# Patient Record
Sex: FEMALE | Race: WHITE | NOT HISPANIC OR LATINO | Employment: PART TIME | URBAN - METROPOLITAN AREA
[De-identification: names, ages, dates, MRNs, and addresses within clinical notes are randomized per-mention and may not be internally consistent; named-entity substitution may affect disease eponyms.]

---

## 2021-04-30 LAB — SARS-COV-2 RNA RESP QL NAA+PROBE: POSITIVE

## 2022-01-27 LAB — BCS RECOMMENDATION EXT: NORMAL

## 2022-02-15 LAB
PAP RECOMMENDATION EXT: NORMAL
PAP SMEAR: NORMAL

## 2022-04-11 ENCOUNTER — HISTORICAL (OUTPATIENT)
Dept: ADMINISTRATIVE | Facility: HOSPITAL | Age: 52
End: 2022-04-11

## 2022-04-26 VITALS
DIASTOLIC BLOOD PRESSURE: 79 MMHG | BODY MASS INDEX: 35.59 KG/M2 | OXYGEN SATURATION: 98 % | SYSTOLIC BLOOD PRESSURE: 122 MMHG | HEIGHT: 61 IN | WEIGHT: 188.5 LBS

## 2022-05-24 ENCOUNTER — APPOINTMENT (OUTPATIENT)
Dept: LAB | Facility: HOSPITAL | Age: 52
End: 2022-05-24
Attending: INTERNAL MEDICINE
Payer: COMMERCIAL

## 2022-05-24 DIAGNOSIS — Z80.0 FAMILY HISTORY OF MALIGNANT NEOPLASM OF GASTROINTESTINAL TRACT: Primary | ICD-10-CM

## 2022-05-24 LAB
ALBUMIN SERPL-MCNC: 4.2 GM/DL (ref 3.5–5)
ALBUMIN/GLOB SERPL: 1.6 RATIO (ref 1.1–2)
ALP SERPL-CCNC: 101 UNIT/L (ref 40–150)
ALT SERPL-CCNC: 30 UNIT/L (ref 0–55)
AST SERPL-CCNC: 27 UNIT/L (ref 5–34)
BASOPHILS # BLD AUTO: 0.05 X10(3)/MCL (ref 0–0.2)
BASOPHILS NFR BLD AUTO: 0.7 %
BILIRUBIN DIRECT+TOT PNL SERPL-MCNC: 0.7 MG/DL
BUN SERPL-MCNC: 12.9 MG/DL (ref 9.8–20.1)
CALCIUM SERPL-MCNC: 9.8 MG/DL (ref 8.4–10.2)
CHLORIDE SERPL-SCNC: 103 MMOL/L (ref 98–107)
CO2 SERPL-SCNC: 28 MMOL/L (ref 22–29)
CREAT SERPL-MCNC: 0.75 MG/DL (ref 0.55–1.02)
EOSINOPHIL # BLD AUTO: 0.12 X10(3)/MCL (ref 0–0.9)
EOSINOPHIL NFR BLD AUTO: 1.6 %
ERYTHROCYTE [DISTWIDTH] IN BLOOD BY AUTOMATED COUNT: 12.3 % (ref 11.5–17)
GLOBULIN SER-MCNC: 2.6 GM/DL (ref 2.4–3.5)
GLUCOSE SERPL-MCNC: 139 MG/DL (ref 74–100)
HCT VFR BLD AUTO: 42.8 % (ref 37–47)
HGB BLD-MCNC: 14.5 GM/DL (ref 12–16)
IMM GRANULOCYTES # BLD AUTO: 0.02 X10(3)/MCL (ref 0–0.02)
IMM GRANULOCYTES NFR BLD AUTO: 0.3 % (ref 0–0.43)
LYMPHOCYTES # BLD AUTO: 1.7 X10(3)/MCL (ref 0.6–4.6)
LYMPHOCYTES NFR BLD AUTO: 22.9 %
MCH RBC QN AUTO: 30.6 PG (ref 27–31)
MCHC RBC AUTO-ENTMCNC: 33.9 MG/DL (ref 33–36)
MCV RBC AUTO: 90.3 FL (ref 80–94)
MONOCYTES # BLD AUTO: 0.37 X10(3)/MCL (ref 0.1–1.3)
MONOCYTES NFR BLD AUTO: 5 %
NEUTROPHILS # BLD AUTO: 5.2 X10(3)/MCL (ref 2.1–9.2)
NEUTROPHILS NFR BLD AUTO: 69.5 %
NRBC BLD AUTO-RTO: 0 %
PLATELET # BLD AUTO: 300 X10(3)/MCL (ref 130–400)
PMV BLD AUTO: 10.3 FL (ref 9.4–12.4)
POTASSIUM SERPL-SCNC: 4.1 MMOL/L (ref 3.5–5.1)
PROT SERPL-MCNC: 6.8 GM/DL (ref 6.4–8.3)
RBC # BLD AUTO: 4.74 X10(6)/MCL (ref 4.2–5.4)
SODIUM SERPL-SCNC: 140 MMOL/L (ref 136–145)
WBC # SPEC AUTO: 7.4 X10(3)/MCL (ref 4.5–11.5)

## 2022-05-24 PROCEDURE — 85025 COMPLETE CBC W/AUTO DIFF WBC: CPT

## 2022-05-24 PROCEDURE — 36415 COLL VENOUS BLD VENIPUNCTURE: CPT

## 2022-05-24 PROCEDURE — 80053 COMPREHEN METABOLIC PANEL: CPT

## 2022-06-06 LAB — CRC RECOMMENDATION EXT: NORMAL

## 2022-06-13 ENCOUNTER — OFFICE VISIT (OUTPATIENT)
Dept: URGENT CARE | Facility: CLINIC | Age: 52
End: 2022-06-13
Payer: COMMERCIAL

## 2022-06-13 VITALS
WEIGHT: 175 LBS | DIASTOLIC BLOOD PRESSURE: 82 MMHG | OXYGEN SATURATION: 99 % | SYSTOLIC BLOOD PRESSURE: 132 MMHG | BODY MASS INDEX: 33.04 KG/M2 | HEIGHT: 61 IN | TEMPERATURE: 99 F | RESPIRATION RATE: 18 BRPM | HEART RATE: 86 BPM

## 2022-06-13 DIAGNOSIS — M25.571 ACUTE RIGHT ANKLE PAIN: ICD-10-CM

## 2022-06-13 DIAGNOSIS — S93.491A SPRAIN OF ANTERIOR TALOFIBULAR LIGAMENT OF RIGHT ANKLE, INITIAL ENCOUNTER: Primary | ICD-10-CM

## 2022-06-13 PROCEDURE — 3075F PR MOST RECENT SYSTOLIC BLOOD PRESS GE 130-139MM HG: ICD-10-PCS | Mod: CPTII,,, | Performed by: FAMILY MEDICINE

## 2022-06-13 PROCEDURE — 3008F BODY MASS INDEX DOCD: CPT | Mod: CPTII,,, | Performed by: FAMILY MEDICINE

## 2022-06-13 PROCEDURE — 3079F PR MOST RECENT DIASTOLIC BLOOD PRESSURE 80-89 MM HG: ICD-10-PCS | Mod: CPTII,,, | Performed by: FAMILY MEDICINE

## 2022-06-13 PROCEDURE — 3075F SYST BP GE 130 - 139MM HG: CPT | Mod: CPTII,,, | Performed by: FAMILY MEDICINE

## 2022-06-13 PROCEDURE — 1160F PR REVIEW ALL MEDS BY PRESCRIBER/CLIN PHARMACIST DOCUMENTED: ICD-10-PCS | Mod: CPTII,,, | Performed by: FAMILY MEDICINE

## 2022-06-13 PROCEDURE — 1160F RVW MEDS BY RX/DR IN RCRD: CPT | Mod: CPTII,,, | Performed by: FAMILY MEDICINE

## 2022-06-13 PROCEDURE — 99214 OFFICE O/P EST MOD 30 MIN: CPT | Mod: ,,, | Performed by: FAMILY MEDICINE

## 2022-06-13 PROCEDURE — 3008F PR BODY MASS INDEX (BMI) DOCUMENTED: ICD-10-PCS | Mod: CPTII,,, | Performed by: FAMILY MEDICINE

## 2022-06-13 PROCEDURE — 99214 PR OFFICE/OUTPT VISIT, EST, LEVL IV, 30-39 MIN: ICD-10-PCS | Mod: ,,, | Performed by: FAMILY MEDICINE

## 2022-06-13 PROCEDURE — 1159F PR MEDICATION LIST DOCUMENTED IN MEDICAL RECORD: ICD-10-PCS | Mod: CPTII,,, | Performed by: FAMILY MEDICINE

## 2022-06-13 PROCEDURE — 3079F DIAST BP 80-89 MM HG: CPT | Mod: CPTII,,, | Performed by: FAMILY MEDICINE

## 2022-06-13 PROCEDURE — 1159F MED LIST DOCD IN RCRD: CPT | Mod: CPTII,,, | Performed by: FAMILY MEDICINE

## 2022-06-13 RX ORDER — MELOXICAM 15 MG/1
15 TABLET ORAL DAILY
Qty: 14 TABLET | Refills: 0 | Status: SHIPPED | OUTPATIENT
Start: 2022-06-13 | End: 2022-06-27

## 2022-06-13 NOTE — PROGRESS NOTES
"Subjective:       Patient ID: Cecelia Braun is a 51 y.o. female.    Vitals:  height is 5' 1" (1.549 m) and weight is 79.4 kg (175 lb). Her temperature is 98.7 °F (37.1 °C). Her blood pressure is 132/82 and her pulse is 86. Her respiration is 18 and oxygen saturation is 99%.     Chief Complaint: Ankle Injury (Right ankle )    Right ankle injury, pt [place].     Ankle Injury         Constitution: Negative for fatigue and fever.   Cardiovascular: Negative for chest pain.   Musculoskeletal: Positive for pain and trauma.   Neurological: Negative for dizziness.       Objective:      Physical Exam   HENT:   Head: Normocephalic.   Cardiovascular: Normal rate.   Pulmonary/Chest: Effort normal.   Abdominal: Normal appearance.   Musculoskeletal:      Comments: Pos TTP of lateral malleolus and anterior R ankle, no ankle mortise laxity, no TTP of RLE otherwise   Neurological: She is alert.   Psychiatric: Her behavior is normal. Mood normal.   Nursing note and vitals reviewed.        Assessment:       1. Sprain of anterior talofibular ligament of right ankle, initial encounter    2. Acute right ankle pain          Plan:         Sprain of anterior talofibular ligament of right ankle, initial encounter    Acute right ankle pain  -     XR ANKLE COMPLETE 3 VIEW RIGHT; Future; Expected date: 06/13/2022            Xray of the right ankle done today, per my review no fracture.   Final also negative for fracture.        "

## 2022-06-13 NOTE — PATIENT INSTRUCTIONS
Wrap, ice, elevate. Mobic with food.  Reduce activity for at least 2 weeks.  Use as tolerated otherwise.   Final x ray returned negative for fracture.

## 2022-09-19 ENCOUNTER — OFFICE VISIT (OUTPATIENT)
Dept: URGENT CARE | Facility: CLINIC | Age: 52
End: 2022-09-19
Payer: COMMERCIAL

## 2022-09-19 ENCOUNTER — TELEPHONE (OUTPATIENT)
Dept: URGENT CARE | Facility: CLINIC | Age: 52
End: 2022-09-19

## 2022-09-19 VITALS
HEIGHT: 61 IN | RESPIRATION RATE: 20 BRPM | BODY MASS INDEX: 33.04 KG/M2 | TEMPERATURE: 99 F | HEART RATE: 74 BPM | WEIGHT: 175 LBS | OXYGEN SATURATION: 98 % | DIASTOLIC BLOOD PRESSURE: 85 MMHG | SYSTOLIC BLOOD PRESSURE: 127 MMHG

## 2022-09-19 DIAGNOSIS — R10.9 FLANK PAIN: ICD-10-CM

## 2022-09-19 DIAGNOSIS — S39.012A BACK STRAIN, INITIAL ENCOUNTER: ICD-10-CM

## 2022-09-19 DIAGNOSIS — R10.9 RIGHT SIDED ABDOMINAL PAIN: Primary | ICD-10-CM

## 2022-09-19 LAB
BILIRUB UR QL STRIP: NEGATIVE
GLUCOSE UR QL STRIP: NEGATIVE
KETONES UR QL STRIP: NEGATIVE
LEUKOCYTE ESTERASE UR QL STRIP: POSITIVE
PH, POC UA: 5
POC BLOOD, URINE: NEGATIVE
POC NITRATES, URINE: NEGATIVE
PROT UR QL STRIP: NEGATIVE
SP GR UR STRIP: 1.01 (ref 1–1.03)
UROBILINOGEN UR STRIP-ACNC: ABNORMAL (ref 0.3–2.2)

## 2022-09-19 PROCEDURE — 3008F PR BODY MASS INDEX (BMI) DOCUMENTED: ICD-10-PCS | Mod: CPTII,,, | Performed by: FAMILY MEDICINE

## 2022-09-19 PROCEDURE — 1159F PR MEDICATION LIST DOCUMENTED IN MEDICAL RECORD: ICD-10-PCS | Mod: CPTII,,, | Performed by: FAMILY MEDICINE

## 2022-09-19 PROCEDURE — 3079F PR MOST RECENT DIASTOLIC BLOOD PRESSURE 80-89 MM HG: ICD-10-PCS | Mod: CPTII,,, | Performed by: FAMILY MEDICINE

## 2022-09-19 PROCEDURE — 1160F PR REVIEW ALL MEDS BY PRESCRIBER/CLIN PHARMACIST DOCUMENTED: ICD-10-PCS | Mod: CPTII,,, | Performed by: FAMILY MEDICINE

## 2022-09-19 PROCEDURE — 81003 URINALYSIS AUTO W/O SCOPE: CPT | Mod: QW,,, | Performed by: FAMILY MEDICINE

## 2022-09-19 PROCEDURE — 3079F DIAST BP 80-89 MM HG: CPT | Mod: CPTII,,, | Performed by: FAMILY MEDICINE

## 2022-09-19 PROCEDURE — 81003 POCT URINALYSIS, DIPSTICK, AUTOMATED, W/O SCOPE: ICD-10-PCS | Mod: QW,,, | Performed by: FAMILY MEDICINE

## 2022-09-19 PROCEDURE — 1160F RVW MEDS BY RX/DR IN RCRD: CPT | Mod: CPTII,,, | Performed by: FAMILY MEDICINE

## 2022-09-19 PROCEDURE — 99214 OFFICE O/P EST MOD 30 MIN: CPT | Mod: ,,, | Performed by: FAMILY MEDICINE

## 2022-09-19 PROCEDURE — 3008F BODY MASS INDEX DOCD: CPT | Mod: CPTII,,, | Performed by: FAMILY MEDICINE

## 2022-09-19 PROCEDURE — 3074F SYST BP LT 130 MM HG: CPT | Mod: CPTII,,, | Performed by: FAMILY MEDICINE

## 2022-09-19 PROCEDURE — 3074F PR MOST RECENT SYSTOLIC BLOOD PRESSURE < 130 MM HG: ICD-10-PCS | Mod: CPTII,,, | Performed by: FAMILY MEDICINE

## 2022-09-19 PROCEDURE — 1159F MED LIST DOCD IN RCRD: CPT | Mod: CPTII,,, | Performed by: FAMILY MEDICINE

## 2022-09-19 PROCEDURE — 99214 PR OFFICE/OUTPT VISIT, EST, LEVL IV, 30-39 MIN: ICD-10-PCS | Mod: ,,, | Performed by: FAMILY MEDICINE

## 2022-09-19 NOTE — PROGRESS NOTES
"Subjective:       Patient ID: Cecelia Braun is a 52 y.o. female.    Vitals:  height is 5' 1" (1.549 m) and weight is 79.4 kg (175 lb). Her oral temperature is 99.3 °F (37.4 °C). Her blood pressure is 127/85 and her pulse is 74. Her respiration is 20 and oxygen saturation is 98%.     Chief Complaint: Back Pain (Back pain for 10 days. Pain is now radiating to right pelvic area. Tried Ibuprofen with no relief. )    10 days of back pain started R lower back and now radiation into the RLQ.  Worse with bending and standing straight.  No dysuria, no NVD, no constipation.  No fever.  No trauma.  Better with NSAID.       Constitution: Negative for chills, sweating and fever.   HENT:  Negative for sinus pressure.    Respiratory:  Negative for cough.    Genitourinary:  Negative for frequency, urgency, flank pain, vaginal discharge and pelvic pain.   Skin:  Negative for rash.   Neurological:  Negative for loss of consciousness.     Objective:      Physical Exam   Constitutional: She is oriented to person, place, and time. She appears well-developed.   HENT:   Head: Normocephalic and atraumatic.   Mouth/Throat: Mucous membranes are normal.   Eyes: Lids are normal.   Neck: Trachea normal. Neck supple.   Cardiovascular: Normal rate, regular rhythm and normal heart sounds.   Pulmonary/Chest: Effort normal and breath sounds normal. No respiratory distress.   Abdominal: Normal appearance. She exhibits no abdominal bruit, no pulsatile midline mass and no mass. Soft. There is abdominal tenderness (R flank). There is no rebound. No hernia.   Musculoskeletal: Normal range of motion.         General: Normal range of motion.   Neurological: no focal deficit. She is alert and oriented to person, place, and time. She has normal strength.   Skin: Skin is warm, dry, intact, not diaphoretic and not pale.   Psychiatric: Her speech is normal and behavior is normal. Judgment and thought content normal.   Nursing note and vitals reviewed.    "   Assessment:       1. Back strain, initial encounter    2. Flank pain    3. Right sided abdominal pain            Plan:         Back strain, initial encounter    Flank pain  -     POCT Urinalysis, Dipstick, Automated, W/O Scope    Right sided abdominal pain  -     XR KUB; Future; Expected date: 09/19/2022          X ray per my review mild constipation. No visualized stones.   Will also call with final report.        Pt deferred Rx treatment at this time for back strain.

## 2022-09-19 NOTE — TELEPHONE ENCOUNTER
----- Message from Juanita Olsen MD sent at 9/19/2022 12:27 PM CDT -----  Please notify pt that final x ray as questionable calcified areas around the right kidney.  Can continue treatment plan, force fluids, but we can also get an ultrasound to further evaluate if desires.   ----- Message -----  From: RT Teressa  Sent: 9/19/2022  12:18 PM CDT  To: [facility] Results      ----- Message -----  From: RT Teressa  Sent: 9/19/2022  11:51 AM CDT  To: [facility] Clinical Support Staff

## 2022-09-20 ENCOUNTER — TELEPHONE (OUTPATIENT)
Dept: URGENT CARE | Facility: CLINIC | Age: 52
End: 2022-09-20
Payer: COMMERCIAL

## 2022-09-22 ENCOUNTER — HOSPITAL ENCOUNTER (OUTPATIENT)
Dept: RADIOLOGY | Facility: HOSPITAL | Age: 52
Discharge: HOME | End: 2022-09-22
Attending: FAMILY MEDICINE
Payer: COMMERCIAL

## 2022-09-22 ENCOUNTER — HISTORICAL (OUTPATIENT)
Dept: ADMINISTRATIVE | Facility: HOSPITAL | Age: 52
End: 2022-09-22
Payer: COMMERCIAL

## 2022-09-22 DIAGNOSIS — R10.9 RIGHT SIDED ABDOMINAL PAIN: ICD-10-CM

## 2022-09-22 PROCEDURE — 76775 US EXAM ABDO BACK WALL LIM: CPT | Mod: TC

## 2022-09-23 ENCOUNTER — TELEPHONE (OUTPATIENT)
Dept: URGENT CARE | Facility: CLINIC | Age: 52
End: 2022-09-23
Payer: COMMERCIAL

## 2022-09-23 NOTE — TELEPHONE ENCOUNTER
Spoke with pt, informed of results. She does not have a PCP, gave her NP Mimi Allison's number, she said she will call and schedule on Monday. She states she is feeling much better and her pain has mostly subsided.

## 2022-09-23 NOTE — TELEPHONE ENCOUNTER
----- Message from Juanita Olsen MD sent at 9/23/2022  8:04 AM CDT -----  Please notify pt that ultrasound showed kidney stones in each kidney, none causing issues, none in the ureter causing blockage.  There is a questionable small mass of the right kidney that should be re-ultrasound in 6 months.  Please follow up with primary MD in regards to this.  How is the patient feeling? Any improvement in R sided pain?   ----- Message -----  From: Interface, Rad Results In  Sent: 9/22/2022   3:39 PM CDT  To: [facility] Results

## 2022-10-04 ENCOUNTER — OFFICE VISIT (OUTPATIENT)
Dept: FAMILY MEDICINE | Facility: CLINIC | Age: 52
End: 2022-10-04
Payer: COMMERCIAL

## 2022-10-04 VITALS
TEMPERATURE: 98 F | BODY MASS INDEX: 35.46 KG/M2 | HEIGHT: 61 IN | DIASTOLIC BLOOD PRESSURE: 86 MMHG | RESPIRATION RATE: 18 BRPM | WEIGHT: 187.81 LBS | SYSTOLIC BLOOD PRESSURE: 136 MMHG | OXYGEN SATURATION: 97 % | HEART RATE: 87 BPM

## 2022-10-04 DIAGNOSIS — Z11.59 ENCOUNTER FOR HEPATITIS C SCREENING TEST FOR LOW RISK PATIENT: ICD-10-CM

## 2022-10-04 DIAGNOSIS — Z11.4 ENCOUNTER FOR SCREENING FOR HIV: ICD-10-CM

## 2022-10-04 DIAGNOSIS — Z00.00 ANNUAL PHYSICAL EXAM: Primary | ICD-10-CM

## 2022-10-04 DIAGNOSIS — R73.9 HYPERGLYCEMIA: ICD-10-CM

## 2022-10-04 DIAGNOSIS — Z87.442 HISTORY OF NEPHROLITHIASIS: ICD-10-CM

## 2022-10-04 DIAGNOSIS — D17.71 ANGIOMYOLIPOMA OF LEFT KIDNEY: ICD-10-CM

## 2022-10-04 PROCEDURE — 1159F MED LIST DOCD IN RCRD: CPT | Mod: CPTII,,, | Performed by: STUDENT IN AN ORGANIZED HEALTH CARE EDUCATION/TRAINING PROGRAM

## 2022-10-04 PROCEDURE — 3079F PR MOST RECENT DIASTOLIC BLOOD PRESSURE 80-89 MM HG: ICD-10-PCS | Mod: CPTII,,, | Performed by: STUDENT IN AN ORGANIZED HEALTH CARE EDUCATION/TRAINING PROGRAM

## 2022-10-04 PROCEDURE — 3008F PR BODY MASS INDEX (BMI) DOCUMENTED: ICD-10-PCS | Mod: CPTII,,, | Performed by: STUDENT IN AN ORGANIZED HEALTH CARE EDUCATION/TRAINING PROGRAM

## 2022-10-04 PROCEDURE — 1159F PR MEDICATION LIST DOCUMENTED IN MEDICAL RECORD: ICD-10-PCS | Mod: CPTII,,, | Performed by: STUDENT IN AN ORGANIZED HEALTH CARE EDUCATION/TRAINING PROGRAM

## 2022-10-04 PROCEDURE — 3075F SYST BP GE 130 - 139MM HG: CPT | Mod: CPTII,,, | Performed by: STUDENT IN AN ORGANIZED HEALTH CARE EDUCATION/TRAINING PROGRAM

## 2022-10-04 PROCEDURE — 99386 PR PREVENTIVE VISIT,NEW,40-64: ICD-10-PCS | Mod: ,,, | Performed by: STUDENT IN AN ORGANIZED HEALTH CARE EDUCATION/TRAINING PROGRAM

## 2022-10-04 PROCEDURE — 3008F BODY MASS INDEX DOCD: CPT | Mod: CPTII,,, | Performed by: STUDENT IN AN ORGANIZED HEALTH CARE EDUCATION/TRAINING PROGRAM

## 2022-10-04 PROCEDURE — 99386 PREV VISIT NEW AGE 40-64: CPT | Mod: ,,, | Performed by: STUDENT IN AN ORGANIZED HEALTH CARE EDUCATION/TRAINING PROGRAM

## 2022-10-04 PROCEDURE — 3075F PR MOST RECENT SYSTOLIC BLOOD PRESS GE 130-139MM HG: ICD-10-PCS | Mod: CPTII,,, | Performed by: STUDENT IN AN ORGANIZED HEALTH CARE EDUCATION/TRAINING PROGRAM

## 2022-10-04 PROCEDURE — 3079F DIAST BP 80-89 MM HG: CPT | Mod: CPTII,,, | Performed by: STUDENT IN AN ORGANIZED HEALTH CARE EDUCATION/TRAINING PROGRAM

## 2022-10-04 NOTE — ASSESSMENT & PLAN NOTE
- Patient had her first episode on 09/19/22. Patient had U/A and KUB performed. She had a kidney U/S on 09/22/22. She denies any fevers, chills, abdominal pain, N/V, flank pain, dysuria, or hematuria. Kidney U/S positive for bilateral nephrolithiasis and a hyperechoic 12mm lesion of the left kidney possibly angiomyolipoma. Patient amenable to getting a follow-up U/S in 6 months.

## 2022-10-04 NOTE — PROGRESS NOTES
"Subjective:      Patient ID: Cecelia Braun is a 52 y.o.  female.    Chief Complaint: Establish Care/Wellness    Preventative Health: Patient following with Dr. Milo Kidd with OB-GYN for her well-woman exam. Patient gets her mammogram performed at the [facility] in [place], [place]. Colon CA Surveillance - Patient following with Dr. Rekha Valdes with GI and Dr. Rick Braun.    FH: Mother had colon CA at 45 years old.    Hx of Kidney Stones: Patient had her first episode on 09/19/22. Patient had a U/A and KUB performed. She had a kidney U/S on 09/22/22. Kidney U/S positive for bilateral nephrolithiasis and a hyperechoic 12mm lesion of the left kidney possibly angiomyolipoma. Patient amenable to getting a follow-up U/S in 6 months. She denies any fevers, chills, abdominal pain, N/V, flank pain, dysuria, or hematuria.    Review of Systems   Constitutional:  Negative for activity change, fatigue and fever.   Eyes:  Negative for visual disturbance.   Respiratory:  Negative for apnea, cough and shortness of breath.    Cardiovascular:  Negative for chest pain and leg swelling.   Gastrointestinal:  Negative for abdominal pain, diarrhea and nausea.   Genitourinary:  Negative for dysuria, flank pain, frequency and hematuria.   Musculoskeletal:  Negative for arthralgias, back pain and myalgias.   Skin:  Negative for rash and wound.   Neurological:  Negative for dizziness, weakness, numbness and headaches.   Psychiatric/Behavioral:  Negative for behavioral problems and dysphoric mood.      Objective:   /86 (BP Location: Right arm, Patient Position: Sitting, BP Method: Large (Automatic))   Pulse 87   Temp 98.2 °F (36.8 °C) (Temporal)   Resp 18   Ht 5' 1" (1.549 m)   Wt 85.2 kg (187 lb 12.8 oz)   SpO2 97%   BMI 35.48 kg/m²     Physical Exam  Vitals and nursing note reviewed.   Constitutional:       General: She is not in acute distress.     Appearance: Normal appearance. She is not " ill-appearing or toxic-appearing.   HENT:      Head: Normocephalic and atraumatic.      Mouth/Throat:      Mouth: Mucous membranes are moist.      Pharynx: Oropharynx is clear.   Cardiovascular:      Rate and Rhythm: Normal rate and regular rhythm.      Heart sounds: Normal heart sounds. No murmur heard.  Pulmonary:      Effort: Pulmonary effort is normal. No respiratory distress.      Breath sounds: Normal breath sounds.   Abdominal:      General: There is no distension.      Palpations: Abdomen is soft.      Tenderness: There is no abdominal tenderness.   Musculoskeletal:         General: No tenderness or deformity.      Cervical back: Neck supple. No tenderness.      Right lower leg: No edema.      Left lower leg: No edema.   Lymphadenopathy:      Cervical: No cervical adenopathy.   Skin:     General: Skin is warm and dry.      Findings: No lesion or rash.   Neurological:      General: No focal deficit present.      Mental Status: She is alert.   Psychiatric:         Mood and Affect: Mood normal.         Behavior: Behavior normal.         Thought Content: Thought content normal.         Judgment: Judgment normal.     Assessment/Plan:   1. Annual physical exam  -     Lipid Panel; Future; Expected date: 10/04/2022  -     HIV 1/2 Ag/Ab (4th Gen); Future; Expected date: 10/04/2022  -     Hepatitis C Antibody; Future; Expected date: 10/04/2022    2. Encounter for screening for HIV  -     HIV 1/2 Ag/Ab (4th Gen); Future; Expected date: 10/04/2022    3. Encounter for hepatitis C screening test for low risk patient  -     Hepatitis C Antibody; Future; Expected date: 10/04/2022    4. Hyperglycemia  -     Hemoglobin A1C; Future; Expected date: 10/04/2022    5. History of nephrolithiasis  Assessment & Plan:  - Patient had her first episode on 09/19/22. Patient had U/A and KUB performed. She had a kidney U/S on 09/22/22. She denies any fevers, chills, abdominal pain, N/V, flank pain, dysuria, or hematuria. Kidney U/S  positive for bilateral nephrolithiasis and a hyperechoic 12mm lesion of the left kidney possibly angiomyolipoma. Patient amenable to getting a follow-up U/S in 6 months.      6. Angiomyolipoma of left kidney  -     US Retroperitoneal Complete; Future; Expected date: 03/22/2023     Follow up in about 1 year (around 10/4/2023) for Wellness.

## 2022-10-06 ENCOUNTER — LAB VISIT (OUTPATIENT)
Dept: LAB | Facility: HOSPITAL | Age: 52
End: 2022-10-06
Attending: STUDENT IN AN ORGANIZED HEALTH CARE EDUCATION/TRAINING PROGRAM
Payer: COMMERCIAL

## 2022-10-06 DIAGNOSIS — R73.9 HYPERGLYCEMIA: ICD-10-CM

## 2022-10-06 DIAGNOSIS — Z00.00 ANNUAL PHYSICAL EXAM: ICD-10-CM

## 2022-10-06 DIAGNOSIS — Z11.59 ENCOUNTER FOR HEPATITIS C SCREENING TEST FOR LOW RISK PATIENT: ICD-10-CM

## 2022-10-06 DIAGNOSIS — Z11.4 ENCOUNTER FOR SCREENING FOR HIV: ICD-10-CM

## 2022-10-06 LAB
CHOLEST SERPL-MCNC: 196 MG/DL
CHOLEST/HDLC SERPL: 3 {RATIO} (ref 0–5)
EST. AVERAGE GLUCOSE BLD GHB EST-MCNC: 105.4 MG/DL
HBA1C MFR BLD: 5.3 %
HDLC SERPL-MCNC: 58 MG/DL (ref 35–60)
LDLC SERPL CALC-MCNC: 121 MG/DL (ref 50–140)
TRIGL SERPL-MCNC: 84 MG/DL (ref 37–140)
VLDLC SERPL CALC-MCNC: 17 MG/DL

## 2022-10-06 PROCEDURE — 80061 LIPID PANEL: CPT

## 2022-10-06 PROCEDURE — 83036 HEMOGLOBIN GLYCOSYLATED A1C: CPT

## 2022-10-06 PROCEDURE — 36415 COLL VENOUS BLD VENIPUNCTURE: CPT

## 2022-10-06 PROCEDURE — 86803 HEPATITIS C AB TEST: CPT

## 2022-10-06 PROCEDURE — 87389 HIV-1 AG W/HIV-1&-2 AB AG IA: CPT

## 2022-10-07 LAB — HIV 1+2 AB+HIV1 P24 AG SERPL QL IA: NONREACTIVE

## 2022-10-10 LAB — HCV AB SERPL QL IA: NONREACTIVE

## 2022-11-23 ENCOUNTER — DOCUMENTATION ONLY (OUTPATIENT)
Dept: ADMINISTRATIVE | Facility: HOSPITAL | Age: 52
End: 2022-11-23
Payer: COMMERCIAL

## 2022-11-28 ENCOUNTER — DOCUMENTATION ONLY (OUTPATIENT)
Dept: ADMINISTRATIVE | Facility: HOSPITAL | Age: 52
End: 2022-11-28
Payer: COMMERCIAL

## 2023-01-30 LAB — BCS RECOMMENDATION EXT: NORMAL

## 2023-02-27 LAB
PAP RECOMMENDATION EXT: NORMAL
PAP SMEAR: NORMAL

## 2023-03-23 ENCOUNTER — HOSPITAL ENCOUNTER (OUTPATIENT)
Dept: RADIOLOGY | Facility: HOSPITAL | Age: 53
Discharge: HOME | End: 2023-03-23
Attending: STUDENT IN AN ORGANIZED HEALTH CARE EDUCATION/TRAINING PROGRAM
Payer: COMMERCIAL

## 2023-03-23 DIAGNOSIS — D17.71 ANGIOMYOLIPOMA OF LEFT KIDNEY: ICD-10-CM

## 2023-03-23 PROCEDURE — 76770 US EXAM ABDO BACK WALL COMP: CPT | Mod: TC

## 2023-03-24 DIAGNOSIS — N28.1 COMPLEX RENAL CYST: Primary | ICD-10-CM

## 2023-03-29 ENCOUNTER — TELEPHONE (OUTPATIENT)
Dept: FAMILY MEDICINE | Facility: CLINIC | Age: 53
End: 2023-03-29
Payer: COMMERCIAL

## 2023-03-29 DIAGNOSIS — N28.1 COMPLEX RENAL CYST: Primary | ICD-10-CM

## 2023-03-29 NOTE — TELEPHONE ENCOUNTER
Orders Placed This Encounter   Procedures    CT Abdomen Pelvis W Wo Contrast     Standing Status:   Future     Standing Expiration Date:   3/29/2024     Order Specific Question:   Is the patient allergic to iodine contrast?     Answer:   No     Order Specific Question:   Is the patient taking metformin or a metformin combination medication?  If so, the patient should hold the medication for 2 days following contrast.     Answer:   No     Order Specific Question:   Does this patient have impaired renal function?     Answer:   No     Order Specific Question:   Does the patient have high blood pressure requiring medical treatment?     Answer:   No     Order Specific Question:   Diabetes?     Answer:   No     Order Specific Question:   May the Radiologist modify the order per protocol to meet the clinical needs of the patient?     Answer:   Yes     Order Specific Question:   Oral/Rectal Contrast instructions:     Answer:   NO Oral Contrast     Order Specific Question:   Special CT ABD Protocol Request?     Answer:   Routine

## 2023-03-29 NOTE — TELEPHONE ENCOUNTER
----- Message from Niya Batista LPN sent at 3/29/2023  4:49 PM CDT -----  Regarding: FW: need order  Please advise  ----- Message -----  From: Latonya Braun  Sent: 3/29/2023   2:35 PM CDT  To: [facility] Staff  Subject: need order                                       Type:  Needs Medical Advice    Who Called:  [facility]    Would the patient rather a call back? Yes    Best Call Back Number: [phone] opt# [phone]    Additional Information: need knew order put in for CT with and without for pelvis and abdomen pt is coming in 4-4-23 please call before to verify thanks

## 2023-04-03 ENCOUNTER — TELEPHONE (OUTPATIENT)
Dept: FAMILY MEDICINE | Facility: CLINIC | Age: 53
End: 2023-04-03
Payer: COMMERCIAL

## 2023-04-03 DIAGNOSIS — N28.1 COMPLEX RENAL CYST: Primary | ICD-10-CM

## 2023-04-03 NOTE — TELEPHONE ENCOUNTER
----- Message -----   From: Petty Patel   Sent: 3/31/2023  12:08 PM CDT   To: [facility] Staff   Subject: Peer to Peer                                     Greetings,     As per the insurance, Authorization does not meet their medical criteria. Peer to Peer Discussion is being requested. If MD is unable to complete discussion, any other MD, PA or NP can do it. As per [facility]'s Financial Clearance Policy, can you let us know if test is medically urgent or not?     Test Requested:CT ABDOMEN PELVIS W WO CONTRAST   Date of Service: 04/04/2023   Peer to Peer Discussion Reason:Per Rohith the case has be en denied as Your healthcare provider told us that you may have a kidney mass (growth). The request cannot be approved because: ? Picture studies of more than one body region are not needed to show your doctor the details needed to treat you. A picture study of one body region is sufficient. ? A detailed picture study (computed tomography or CT [id number]) of your abdomen with pictures taken before and after using a dye is more likely to show your doctor all they need to see in order to treat you. This study will be approved if ordered. Therefore moved to [facility] team.   Peer to Peer Ph# [phone]   Reference Case# [id number]   How long do you have to complete discussion? 10 days     Thanks,   Petty   EXT [phone]

## 2023-04-04 ENCOUNTER — TELEPHONE (OUTPATIENT)
Dept: FAMILY MEDICINE | Facility: CLINIC | Age: 53
End: 2023-04-04
Payer: COMMERCIAL

## 2023-04-05 ENCOUNTER — PATIENT MESSAGE (OUTPATIENT)
Dept: FAMILY MEDICINE | Facility: CLINIC | Age: 53
End: 2023-04-05
Payer: COMMERCIAL

## 2023-04-05 NOTE — TELEPHONE ENCOUNTER
Spoke to pt and advised that Dr Gilliam had placed another order in the chart for a different test to be ordered. She states that she received a letter in the mail today letting her know what test will be covered. She is going to try to send the letter via the pt portal. If she has problems, she will drop it by the office on tomorrow

## 2023-04-05 NOTE — TELEPHONE ENCOUNTER
Spoke to pt and advised her that the precert department had already contacted Dr Gilliam about this and that Dr Gilliam has already ordered the appropriate test. I advised her that what she is scheduled for on 04/13/2023 is the old order. She states that she will call to make sure that [facility] has the right information. I advised her that if she has any problems to please let me know

## 2023-04-06 ENCOUNTER — TELEPHONE (OUTPATIENT)
Dept: FAMILY MEDICINE | Facility: CLINIC | Age: 53
End: 2023-04-06
Payer: COMMERCIAL

## 2023-04-06 NOTE — TELEPHONE ENCOUNTER
----- Message from Jasmin Braun sent at 4/5/2023  4:29 PM CDT -----  .Type:  Needs Medical Advice    Who Called: pt   Symptoms (please be specific):    How long has patient had these symptoms:    Pharmacy name and phone #:    Would the patient rather a call back or a response via [facility]? C/b  Best Call Back Number: [phone]  Additional Information: pt returning a call to Niya

## 2023-04-14 ENCOUNTER — HOSPITAL ENCOUNTER (OUTPATIENT)
Dept: RADIOLOGY | Facility: HOSPITAL | Age: 53
Discharge: HOME | End: 2023-04-14
Attending: STUDENT IN AN ORGANIZED HEALTH CARE EDUCATION/TRAINING PROGRAM
Payer: COMMERCIAL

## 2023-04-14 DIAGNOSIS — N28.1 COMPLEX RENAL CYST: ICD-10-CM

## 2023-04-14 LAB
CREAT SERPL-MCNC: 0.7 MG/DL (ref 0.5–1.4)
SAMPLE: NORMAL

## 2023-04-14 PROCEDURE — 25500020 PHARM REV CODE 255: Performed by: STUDENT IN AN ORGANIZED HEALTH CARE EDUCATION/TRAINING PROGRAM

## 2023-04-14 PROCEDURE — 74170 CT ABD WO CNTRST FLWD CNTRST: CPT | Mod: TC

## 2023-04-14 RX ADMIN — IOPAMIDOL 100 ML: 755 INJECTION, SOLUTION INTRAVENOUS at 03:04

## 2023-04-17 DIAGNOSIS — N28.9 KIDNEY LESION: Primary | ICD-10-CM

## 2023-08-28 ENCOUNTER — TELEPHONE (OUTPATIENT)
Dept: FAMILY MEDICINE | Facility: CLINIC | Age: 53
End: 2023-08-28
Payer: COMMERCIAL

## 2023-08-28 NOTE — TELEPHONE ENCOUNTER
----- Message from [facility] sent at 8/28/2023  1:11 PM CDT -----  Regarding: referall  .Type:  Patient Requesting Referral    Who Called: pt    Does the patient already have the specialty appointment scheduled?: no    If yes, what is the date of that appointment?: no    Referral to What Specialty: Cardiologist     Reason for Referral: getting old want a check up     Does the patient want the referral with a specific physician?: no    Is the specialist an [facility]?: yes    Patient Requesting a Response?: yes    Would the patient rather a call back? Yes    Best Call Back Number: [phone]    Additional Information:  referral thanks

## 2023-08-28 NOTE — TELEPHONE ENCOUNTER
I have a lot of patients that reach out to Cardiology themselves to schedule a routine visit without a referral. I also don't have a cardiac medical condition to refer patient to Cardiology. Does she have any family history of heart disease?

## 2023-08-28 NOTE — TELEPHONE ENCOUNTER
Spoke to patient, she states she understands and does not have a family history of heart diease. Would like to get checked by a cardiologist due to her age and just to make sure she's okay. She states that she called a clinic to schedule an appointment and the  told her that she would need a referral to be seen. She says she will call around to see if she can get an appointment without a referral.

## 2023-10-05 ENCOUNTER — OFFICE VISIT (OUTPATIENT)
Dept: FAMILY MEDICINE | Facility: CLINIC | Age: 53
End: 2023-10-05
Payer: COMMERCIAL

## 2023-10-05 VITALS
SYSTOLIC BLOOD PRESSURE: 137 MMHG | TEMPERATURE: 98 F | OXYGEN SATURATION: 100 % | HEIGHT: 61 IN | DIASTOLIC BLOOD PRESSURE: 85 MMHG | RESPIRATION RATE: 18 BRPM | WEIGHT: 186.38 LBS | BODY MASS INDEX: 35.19 KG/M2 | HEART RATE: 96 BPM

## 2023-10-05 DIAGNOSIS — R73.9 HYPERGLYCEMIA: ICD-10-CM

## 2023-10-05 DIAGNOSIS — N28.1 RENAL CYST, RIGHT: ICD-10-CM

## 2023-10-05 DIAGNOSIS — Z00.00 ANNUAL PHYSICAL EXAM: Primary | ICD-10-CM

## 2023-10-05 DIAGNOSIS — Z13.220 NEED FOR LIPID SCREENING: ICD-10-CM

## 2023-10-05 PROBLEM — Z87.442 HISTORY OF NEPHROLITHIASIS: Status: RESOLVED | Noted: 2022-10-04 | Resolved: 2023-10-05

## 2023-10-05 PROCEDURE — 3075F PR MOST RECENT SYSTOLIC BLOOD PRESS GE 130-139MM HG: ICD-10-PCS | Mod: CPTII,,, | Performed by: STUDENT IN AN ORGANIZED HEALTH CARE EDUCATION/TRAINING PROGRAM

## 2023-10-05 PROCEDURE — 1159F PR MEDICATION LIST DOCUMENTED IN MEDICAL RECORD: ICD-10-PCS | Mod: CPTII,,, | Performed by: STUDENT IN AN ORGANIZED HEALTH CARE EDUCATION/TRAINING PROGRAM

## 2023-10-05 PROCEDURE — 1159F MED LIST DOCD IN RCRD: CPT | Mod: CPTII,,, | Performed by: STUDENT IN AN ORGANIZED HEALTH CARE EDUCATION/TRAINING PROGRAM

## 2023-10-05 PROCEDURE — 99396 PREV VISIT EST AGE 40-64: CPT | Mod: ,,, | Performed by: STUDENT IN AN ORGANIZED HEALTH CARE EDUCATION/TRAINING PROGRAM

## 2023-10-05 PROCEDURE — 99396 PR PREVENTIVE VISIT,EST,40-64: ICD-10-PCS | Mod: ,,, | Performed by: STUDENT IN AN ORGANIZED HEALTH CARE EDUCATION/TRAINING PROGRAM

## 2023-10-05 PROCEDURE — 3079F PR MOST RECENT DIASTOLIC BLOOD PRESSURE 80-89 MM HG: ICD-10-PCS | Mod: CPTII,,, | Performed by: STUDENT IN AN ORGANIZED HEALTH CARE EDUCATION/TRAINING PROGRAM

## 2023-10-05 PROCEDURE — 3075F SYST BP GE 130 - 139MM HG: CPT | Mod: CPTII,,, | Performed by: STUDENT IN AN ORGANIZED HEALTH CARE EDUCATION/TRAINING PROGRAM

## 2023-10-05 PROCEDURE — 3079F DIAST BP 80-89 MM HG: CPT | Mod: CPTII,,, | Performed by: STUDENT IN AN ORGANIZED HEALTH CARE EDUCATION/TRAINING PROGRAM

## 2023-10-05 PROCEDURE — 3008F PR BODY MASS INDEX (BMI) DOCUMENTED: ICD-10-PCS | Mod: CPTII,,, | Performed by: STUDENT IN AN ORGANIZED HEALTH CARE EDUCATION/TRAINING PROGRAM

## 2023-10-05 PROCEDURE — 3008F BODY MASS INDEX DOCD: CPT | Mod: CPTII,,, | Performed by: STUDENT IN AN ORGANIZED HEALTH CARE EDUCATION/TRAINING PROGRAM

## 2023-10-05 NOTE — PROGRESS NOTES
"Subjective:      Patient ID: Cecelia Braun is a 53 y.o.  female.    Chief Complaint: Wellness    Preventative Health: Patient following with Dr. Milo Kidd with OB-GYN for her well-woman exam. Patient gets her mammograms performed at the [facility] in [place], [place]. Patient following with Dr. Rekha Valdes with GI and Dr. Rick Braun.    Right Renal Cyst: Patient following with Dr. Cam Ramirez with Urology.    Review of Systems   Constitutional:  Negative for activity change, appetite change, chills, diaphoresis, fatigue, fever and unexpected weight change.   Eyes:  Negative for visual disturbance.   Respiratory:  Negative for apnea, cough and shortness of breath.    Cardiovascular:  Negative for chest pain, palpitations and leg swelling.   Gastrointestinal:  Negative for abdominal pain, blood in stool, constipation, diarrhea, nausea and vomiting.   Genitourinary:  Negative for difficulty urinating, dysuria, flank pain, frequency, hematuria and urgency.   Musculoskeletal:  Negative for arthralgias, back pain and myalgias.   Skin:  Negative for rash and wound.   Neurological:  Negative for dizziness, syncope, weakness, numbness and headaches.   Psychiatric/Behavioral:  Negative for behavioral problems, dysphoric mood and sleep disturbance. The patient is not nervous/anxious.      Objective:   /85 (BP Location: Right arm, Patient Position: Sitting, BP Method: Small (Automatic))   Pulse 96   Temp 98 °F (36.7 °C) (Temporal)   Resp 18   Ht 5' 1" (1.549 m)   Wt 84.6 kg (186 lb 6.4 oz)   SpO2 100%   BMI 35.22 kg/m²     Physical Exam  Vitals and nursing note reviewed.   Constitutional:       General: She is not in acute distress.     Appearance: Normal appearance. She is not ill-appearing or toxic-appearing.   HENT:      Head: Normocephalic and atraumatic.      Mouth/Throat:      Mouth: Mucous membranes are moist.      Pharynx: Oropharynx is clear.   Eyes:      " Conjunctiva/sclera: Conjunctivae normal.   Cardiovascular:      Rate and Rhythm: Normal rate and regular rhythm.      Heart sounds: Normal heart sounds. No murmur heard.  Pulmonary:      Effort: Pulmonary effort is normal. No respiratory distress.      Breath sounds: Normal breath sounds.   Abdominal:      General: There is no distension.      Palpations: Abdomen is soft.      Tenderness: There is no abdominal tenderness.   Musculoskeletal:         General: No tenderness or deformity.      Cervical back: Neck supple. No tenderness.      Right lower leg: No edema.      Left lower leg: No edema.   Lymphadenopathy:      Cervical: No cervical adenopathy.   Skin:     General: Skin is warm and dry.      Findings: No lesion or rash.   Neurological:      General: No focal deficit present.      Mental Status: She is alert. Mental status is at baseline.      Motor: No weakness.      Coordination: Coordination normal.   Psychiatric:         Mood and Affect: Mood normal.         Behavior: Behavior normal.         Thought Content: Thought content normal.         Judgment: Judgment normal.       Assessment/Plan:   1. Annual physical exam  -     Lipid Panel; Future; Expected date: 10/05/2023  -     Basic Metabolic Panel; Future; Expected date: 10/05/2023  -     Hemoglobin A1C; Future; Expected date: 10/05/2023    2. Need for lipid screening  -     Lipid Panel; Future; Expected date: 10/05/2023    3. Hyperglycemia  -     Hemoglobin A1C; Future; Expected date: 10/05/2023    4. Renal cyst, right  Assessment & Plan:  - Stable.  - Patient following with Dr. Cam Ramirez with Urology.         Follow up in about 1 year (around 10/5/2024) for Wellness.

## 2023-10-11 ENCOUNTER — LAB VISIT (OUTPATIENT)
Dept: LAB | Facility: HOSPITAL | Age: 53
End: 2023-10-11
Attending: STUDENT IN AN ORGANIZED HEALTH CARE EDUCATION/TRAINING PROGRAM
Payer: COMMERCIAL

## 2023-10-11 DIAGNOSIS — Z13.220 NEED FOR LIPID SCREENING: ICD-10-CM

## 2023-10-11 DIAGNOSIS — Z00.00 ANNUAL PHYSICAL EXAM: ICD-10-CM

## 2023-10-11 DIAGNOSIS — R73.9 HYPERGLYCEMIA: ICD-10-CM

## 2023-10-11 LAB
ANION GAP SERPL CALC-SCNC: 11 MEQ/L
BUN SERPL-MCNC: 12.8 MG/DL (ref 9.8–20.1)
CALCIUM SERPL-MCNC: 9.4 MG/DL (ref 8.4–10.2)
CHLORIDE SERPL-SCNC: 105 MMOL/L (ref 98–107)
CHOLEST SERPL-MCNC: 212 MG/DL
CHOLEST/HDLC SERPL: 4 {RATIO} (ref 0–5)
CO2 SERPL-SCNC: 26 MMOL/L (ref 22–29)
CREAT SERPL-MCNC: 0.8 MG/DL (ref 0.55–1.02)
CREAT/UREA NIT SERPL: 16
EST. AVERAGE GLUCOSE BLD GHB EST-MCNC: 114 MG/DL
GFR SERPLBLD CREATININE-BSD FMLA CKD-EPI: >60 MLS/MIN/1.73/M2
GLUCOSE SERPL-MCNC: 118 MG/DL (ref 74–100)
HBA1C MFR BLD: 5.6 %
HDLC SERPL-MCNC: 55 MG/DL (ref 35–60)
LDLC SERPL CALC-MCNC: 139 MG/DL (ref 50–140)
POTASSIUM SERPL-SCNC: 4.5 MMOL/L (ref 3.5–5.1)
SODIUM SERPL-SCNC: 142 MMOL/L (ref 136–145)
TRIGL SERPL-MCNC: 88 MG/DL (ref 37–140)
VLDLC SERPL CALC-MCNC: 18 MG/DL

## 2023-10-11 PROCEDURE — 36415 COLL VENOUS BLD VENIPUNCTURE: CPT

## 2023-10-11 PROCEDURE — 83036 HEMOGLOBIN GLYCOSYLATED A1C: CPT

## 2023-10-11 PROCEDURE — 80048 BASIC METABOLIC PNL TOTAL CA: CPT

## 2023-10-11 PROCEDURE — 80061 LIPID PANEL: CPT

## 2023-10-16 ENCOUNTER — PATIENT OUTREACH (OUTPATIENT)
Dept: ADMINISTRATIVE | Facility: HOSPITAL | Age: 53
End: 2023-10-16
Payer: COMMERCIAL

## 2023-10-16 NOTE — PROGRESS NOTES
The following record(s)  below were uploaded for Health Maintenance .    PAP SMEAR  2/27/23  MAMMOGRAM 1/30/2023

## 2023-12-08 ENCOUNTER — OFFICE VISIT (OUTPATIENT)
Dept: URGENT CARE | Facility: CLINIC | Age: 53
End: 2023-12-08
Payer: COMMERCIAL

## 2023-12-08 VITALS
DIASTOLIC BLOOD PRESSURE: 86 MMHG | WEIGHT: 186 LBS | HEIGHT: 61 IN | RESPIRATION RATE: 16 BRPM | TEMPERATURE: 99 F | SYSTOLIC BLOOD PRESSURE: 140 MMHG | BODY MASS INDEX: 35.12 KG/M2 | HEART RATE: 95 BPM | OXYGEN SATURATION: 99 %

## 2023-12-08 DIAGNOSIS — U07.1 COVID: Primary | ICD-10-CM

## 2023-12-08 DIAGNOSIS — R68.83 CHILLS: ICD-10-CM

## 2023-12-08 LAB
CTP QC/QA: YES
CTP QC/QA: YES
POC MOLECULAR INFLUENZA A AGN: NEGATIVE
POC MOLECULAR INFLUENZA B AGN: NEGATIVE
SARS-COV-2 RDRP RESP QL NAA+PROBE: POSITIVE

## 2023-12-08 PROCEDURE — 87502 POCT INFLUENZA A/B MOLECULAR: ICD-10-PCS | Mod: QW,,, | Performed by: NURSE PRACTITIONER

## 2023-12-08 PROCEDURE — 87635 SARS-COV-2 COVID-19 AMP PRB: CPT | Mod: QW,,, | Performed by: NURSE PRACTITIONER

## 2023-12-08 PROCEDURE — 99213 OFFICE O/P EST LOW 20 MIN: CPT | Mod: ,,, | Performed by: NURSE PRACTITIONER

## 2023-12-08 PROCEDURE — 99213 PR OFFICE/OUTPT VISIT, EST, LEVL III, 20-29 MIN: ICD-10-PCS | Mod: ,,, | Performed by: NURSE PRACTITIONER

## 2023-12-08 PROCEDURE — 87635: ICD-10-PCS | Mod: QW,,, | Performed by: NURSE PRACTITIONER

## 2023-12-08 PROCEDURE — 87502 INFLUENZA DNA AMP PROBE: CPT | Mod: QW,,, | Performed by: NURSE PRACTITIONER

## 2023-12-08 NOTE — PROGRESS NOTES
"Subjective:      Patient ID: Cecelia Braun is a 53 y.o. female.    Vitals:  height is 5' 1" (1.549 m) and weight is 84.4 kg (186 lb). Her temperature is 98.6 °F (37 °C). Her blood pressure is 140/86 (abnormal) and her pulse is 95. Her respiration is 16 and oxygen saturation is 99%.     Chief Complaint: Cough (Last night chills. Woke up in sweats. Now having runny nose, thick cough. Tried advil and mucinex yesterday. )    This is a 54-year-old female presents to urgent care with a 1 day history of chills body aches runny nose and congestion.  Denies any shortness a breath nausea vomiting or diarrhea.  Denies any fever loss of taste loss of smell or any other current symptoms.        Constitution: Positive for chills, sweating and fever.   HENT:  Positive for congestion and sinus pressure.       Objective:     Physical Exam   Constitutional: She is oriented to person, place, and time. She appears well-developed. She is cooperative.  Non-toxic appearance. She does not appear ill. No distress.   HENT:   Head: Normocephalic and atraumatic.   Ears:   Right Ear: Hearing, tympanic membrane, external ear and ear canal normal.   Left Ear: Hearing, tympanic membrane, external ear and ear canal normal.   Nose: Nose normal. No mucosal edema, rhinorrhea or nasal deformity. No epistaxis. Right sinus exhibits no maxillary sinus tenderness and no frontal sinus tenderness. Left sinus exhibits no maxillary sinus tenderness and no frontal sinus tenderness.   Mouth/Throat: Uvula is midline, oropharynx is clear and moist and mucous membranes are normal. No trismus in the jaw. Normal dentition. No uvula swelling. No oropharyngeal exudate, posterior oropharyngeal edema or posterior oropharyngeal erythema.   Eyes: Conjunctivae and lids are normal. No scleral icterus.   Neck: Trachea normal and phonation normal. Neck supple. No edema present. No erythema present. No neck rigidity present.   Cardiovascular: Normal rate, regular rhythm, " "normal heart sounds and normal pulses.   Pulmonary/Chest: Effort normal and breath sounds normal. No respiratory distress. She has no decreased breath sounds. She has no rhonchi.   Abdominal: Normal appearance.   Musculoskeletal: Normal range of motion.         General: No deformity. Normal range of motion.   Neurological: She is alert and oriented to person, place, and time. She exhibits normal muscle tone. Coordination normal.   Skin: Skin is warm, dry, intact, not diaphoretic and not pale.   Psychiatric: Her speech is normal and behavior is normal. Judgment and thought content normal.   Nursing note and vitals reviewed.         Previous History      Review of patient's allergies indicates:  No Known Allergies    Past Medical History:   Diagnosis Date    Kidney stones     No known health problems      No current outpatient medications  Past Surgical History:   Procedure Laterality Date     SECTION      COLONOSCOPY      HEMORRHOID SURGERY      TONSILLECTOMY           Social History     Tobacco Use    Smoking status: Never    Smokeless tobacco: Never   Substance Use Topics    Alcohol use: Not Currently    Drug use: Never        Physical Exam      Vital Signs Reviewed   BP (!) 140/86   Pulse 95   Temp 98.6 °F (37 °C)   Resp 16   Ht 5' 1" (1.549 m)   Wt 84.4 kg (186 lb)   SpO2 99%   BMI 35.14 kg/m²        Procedures    Procedures     Labs     Results for orders placed or performed in visit on 23   POCT COVID-19 Rapid Screening   Result Value Ref Range    POC Rapid COVID Positive (A) Negative     Acceptable Yes       Assessment:     1. COVID    2. Chills        Plan:   COVID Positive  Start multi vitamin daily. Current CDC guidelines recommend that you quarantine for 5 days starting the day after your symptoms began. Quarantine can end after 5 days as long as the last 24 hours of quarantine you are fever free and there is improvement of all your symptoms. Wear a mask around others " "for 5 additional days after quarantine. Treat your symptoms as you would the common cold. If you live with anybody, isolate yourself in a separate bedroom and use a separate bathroom. If your symptoms worsen or you develop shortness of breath or a fever over 103, seek medical attention immediately.     (1) OTC Nasal irrigation: OTC nasal irrigation device (e.g., Navage, Equate Sinus Wash, [facility] Nasal Wash, Neti Pot, NeilMed Sinus Rinse) or saline nasal sprays (e.g., Equate Nasal [place], [facility] Saline Nasal [place], [facility] Saline Nasal Spray);     (2) OTC Nasal steroid sprays: e.g., Flonase, Nasacort, [facility] Budesonide Nasal Spray;    (3) OTC Fast acting nasal decongestants (e.g., Afrin, Oxymetazoline). Can be used sparingly for instant relief but for not longer than 3 days, as the nose develops a tolerance for it;    (4) OTC Oral decongestants: products containing phenylephrine that will say "decongestant" on the box (e.g., Sudafed, Mucinex-Sinus, Tylenol Sinus, Allegra-D, Benadryl Allergy Plus Congestion, [facility] Nasal Decongestant PE, Maria Antonia [place] Plus-Cold or Sinus)    Otherwise, the runny nose will resolve on its own after the viral infection is over.     COVID    Chills  -     POCT Influenza A/B Molecular  -     POCT COVID-19 Rapid Screening                    "

## 2023-12-08 NOTE — PATIENT INSTRUCTIONS
"    (1) OTC Nasal irrigation: OTC nasal irrigation device (e.g., Navage, Equate Sinus Wash, [facility] Nasal Wash, Neti Pot, NeilMed Sinus Rinse) or saline nasal sprays (e.g., Equate Nasal Aneta, [facility]S Saline Nasal Aneta, [facility] Saline Nasal Spray);     (2) OTC Nasal steroid sprays: e.g., Flonase, Nasacort, [facility]S Budesonide Nasal Spray;    (3) OTC Fast acting nasal decongestants (e.g., Afrin, Oxymetazoline). Can be used sparingly for instant relief but for not longer than 3 days, as the nose develops a tolerance for it;    (4) OTC Oral decongestants: products containing phenylephrine that will say "decongestant" on the box (e.g., Sudafed, Mucinex-Sinus, Tylenol Sinus, Allegra-D, Benadryl Allergy Plus Congestion, [facility] Nasal Decongestant PE, Maria Antonia [place] Plus-Cold or Sinus)    Otherwise, the runny nose will resolve on its own after the viral infection is over.   "

## 2023-12-11 ENCOUNTER — PATIENT MESSAGE (OUTPATIENT)
Dept: RESEARCH | Facility: HOSPITAL | Age: 53
End: 2023-12-11
Payer: COMMERCIAL

## 2024-01-31 ENCOUNTER — PATIENT OUTREACH (OUTPATIENT)
Dept: ADMINISTRATIVE | Facility: HOSPITAL | Age: 54
End: 2024-01-31
Payer: COMMERCIAL

## 2024-01-31 NOTE — LETTER
AUTHORIZATION FOR RELEASE OF   CONFIDENTIAL INFORMATION    Dear Lucio, Fax [phone]    We are seeing Cecelia Braun, date of birth 1970, in the clinic at [facility]. Chata Gilliam DO is the patient's PCP. Cecelia Braun has an outstanding lab/procedure at the time we reviewed her chart. In order to help keep her health information updated, she has authorized us to request the following medical record(s):        (  )  MAMMOGRAM                                      ( X )  COLONOSCOPY      (  )  PAP SMEAR                                          (  )  OUTSIDE LAB RESULTS     (  )  DEXA SCAN                                          (  )  EYE EXAM            (  )  FOOT EXAM                                          (  )  ENTIRE RECORD     (  )  OUTSIDE IMMUNIZATIONS                 (  )  _______________         Please fax records to [facility], Nguyen, Mimi T, DO, [phone] or [phone].       If you have any questions you can contact Nadira Ignacio at [phone].         Patient Name: Cecelia Braun  : 1970  Patient Phone #: [phone]

## 2024-01-31 NOTE — PROGRESS NOTES
Population Health Chart Review & Patient Outreach Details      Further Action Needed If Patient Returns Outreach:      Hyper Link Records      Updates Requested / Reviewed:     []  Care Everywhere    []     []  External Sources ([facility], [facility], [facility], etc.)    [] [facility]   [] [facility]   [] Other:    []  Care Team Updated   []  Removed  or Duplicate Orders   []  Immunization Reconciliation Completed / Queried    [] [place]   [] [place]   [] [place]   [] [place]      Health Maintenance Topics Addressed and Outreach Outcomes / Actions Taken:             Breast Cancer Screening []  Mammogram Order Placed    []  Mammogram Screening Scheduled    []  External Records Requested & Care Team Updated if Applicable    []  External Records Uploaded & Care Team Updated if Applicable    []  Pt Declined Scheduling Mammogram    []  Pt Will Schedule with External Provider / Order Routed & Care Team Updated if Applicable              Cervical Cancer Screening []  Pap Smear Scheduled in Primary Care or OBGYN    []  External Records Requested & Care Team Updated if Applicable       []  External Records Uploaded, Care Team Updated, & History Updated if Applicable    []  Patient Declined Scheduling Pap Smear    []  Patient Will Schedule with External Provider & Care Team Updated if Applicable                  Colorectal Cancer Screening []  Colonoscopy Case Request / Referral / Home Test Order Placed    [x]  External Records Requested & Care Team Updated if Applicable    []  External Records Uploaded, Care Team Updated, & History Updated if Applicable    []  Patient Declined Completing Colon Cancer Screening    []  Patient Will Schedule with External Provider & Care Team Updated if Applicable    []  Fit Kit Mailed (add the SmartPhrase under additional notes)    []  Reminded Patient to Complete Home Test                Diabetic Eye Exam []  Eye Exam Screening Order Placed    []  Eye Camera Scheduled or  Optometry/Ophthalmology Referral Placed    []  External Records Requested & Care Team Updated if Applicable    []  External Records Uploaded, Care Team Updated, & History Updated if Applicable    []  Patient Declined Scheduling Eye Exam    []  Patient Will Schedule with External Provider & Care Team Updated if Applicable             Blood Pressure Control []  Primary Care Follow Up Visit Scheduled     []  Remote Blood Pressure Reading Captured    []  Patient Declined Remote Reading or Scheduling Appt - Escalated to PCP    []  Patient Will Call Back or Send Portal Message with Reading                 HbA1c & Other Labs []  Overdue Lab(s) Ordered    []  Overdue Lab(s) Scheduled    []  External Records Uploaded & Care Team Updated if Applicable    []  Primary Care Follow Up Visit Scheduled     []  Reminded Patient to Complete A1c Home Test    []  Patient Declined Scheduling Labs or Will Call Back to Schedule    []  Patient Will Schedule with External Provider / Order Routed, & Care Team Updated if Applicable           Primary Care Appointment []  Primary Care Appt Scheduled    []  Patient Declined Scheduling or Will Call Back to Schedule    []  Pt Established with External Provider, Updated Care Team, & Informed Pt to Notify Payor if Applicable           Medication Adherence /    Statin Use []  Primary Care Appointment Scheduled    []  Patient Reminded to  Prescription    []  Patient Declined, Provider Notified if Needed    []  Sent Provider Message to Review to Evaluate Pt for Statin, Add Exclusion Dx Codes, Document   Exclusion in Problem List, Change Statin Intensity Level to Moderate or High Intensity if Applicable                Osteoporosis Screening []  Dexa Order Placed    []  Dexa Appointment Scheduled    []  External Records Requested & Care Team Updated    []  External Records Uploaded, Care Team Updated, & History Updated if Applicable    []  Patient Declined Scheduling Dexa or Will Call Back to  Schedule    []  Patient Will Schedule with External Provider / Order Routed & Care Team Updated if Applicable       Additional Notes:    Records request sent to Dr. Valdes's office for Colonoscopy.

## 2024-02-01 LAB — BCS RECOMMENDATION EXT: NORMAL

## 2024-03-14 ENCOUNTER — OFFICE VISIT (OUTPATIENT)
Dept: FAMILY MEDICINE | Facility: CLINIC | Age: 54
End: 2024-03-14
Payer: COMMERCIAL

## 2024-03-14 ENCOUNTER — TELEPHONE (OUTPATIENT)
Dept: FAMILY MEDICINE | Facility: CLINIC | Age: 54
End: 2024-03-14

## 2024-03-14 VITALS
DIASTOLIC BLOOD PRESSURE: 88 MMHG | HEIGHT: 61 IN | SYSTOLIC BLOOD PRESSURE: 139 MMHG | WEIGHT: 183.31 LBS | OXYGEN SATURATION: 99 % | BODY MASS INDEX: 34.61 KG/M2 | TEMPERATURE: 98 F | RESPIRATION RATE: 16 BRPM | HEART RATE: 98 BPM

## 2024-03-14 DIAGNOSIS — R03.0 ELEVATED BP WITHOUT DIAGNOSIS OF HYPERTENSION: Primary | ICD-10-CM

## 2024-03-14 PROCEDURE — 3075F SYST BP GE 130 - 139MM HG: CPT | Mod: CPTII,,, | Performed by: STUDENT IN AN ORGANIZED HEALTH CARE EDUCATION/TRAINING PROGRAM

## 2024-03-14 PROCEDURE — 3079F DIAST BP 80-89 MM HG: CPT | Mod: CPTII,,, | Performed by: STUDENT IN AN ORGANIZED HEALTH CARE EDUCATION/TRAINING PROGRAM

## 2024-03-14 PROCEDURE — 1159F MED LIST DOCD IN RCRD: CPT | Mod: CPTII,,, | Performed by: STUDENT IN AN ORGANIZED HEALTH CARE EDUCATION/TRAINING PROGRAM

## 2024-03-14 PROCEDURE — 99213 OFFICE O/P EST LOW 20 MIN: CPT | Mod: ,,, | Performed by: STUDENT IN AN ORGANIZED HEALTH CARE EDUCATION/TRAINING PROGRAM

## 2024-03-14 PROCEDURE — 3008F BODY MASS INDEX DOCD: CPT | Mod: CPTII,,, | Performed by: STUDENT IN AN ORGANIZED HEALTH CARE EDUCATION/TRAINING PROGRAM

## 2024-03-14 NOTE — PROGRESS NOTES
"Subjective:      Patient ID: Cecelia Braun is a 53 y.o.  female.    Chief Complaint: Elevated Blood Pressure    Elevated Blood Pressure: Patient said her systolic BP was in the 160s on March 4th. Patient doesn't routinely check her blood pressures. She says she's been under a lot of stress.    Review of Systems   Constitutional:  Negative for activity change, appetite change, chills, diaphoresis, fatigue, fever and unexpected weight change.   Eyes:  Negative for visual disturbance.   Respiratory:  Negative for shortness of breath.    Cardiovascular:  Negative for chest pain, palpitations and leg swelling.   Gastrointestinal:  Negative for abdominal pain, nausea and vomiting.   Musculoskeletal:  Negative for arthralgias.   Neurological:  Negative for dizziness, syncope, weakness, light-headedness, numbness and headaches.   Psychiatric/Behavioral:  The patient is nervous/anxious (stress).      Objective:   /88 (BP Location: Right arm)   Pulse 98   Temp 98.3 °F (36.8 °C) (Temporal)   Resp 16   Ht 5' 1" (1.549 m)   Wt 83.1 kg (183 lb 4.8 oz)   SpO2 99%   BMI 34.63 kg/m²     Physical Exam  Vitals and nursing note reviewed.   Constitutional:       General: She is not in acute distress.     Appearance: Normal appearance. She is not ill-appearing or toxic-appearing.   HENT:      Head: Normocephalic and atraumatic.   Eyes:      Conjunctiva/sclera: Conjunctivae normal.   Cardiovascular:      Rate and Rhythm: Normal rate and regular rhythm.      Heart sounds: Normal heart sounds. No murmur heard.  Pulmonary:      Effort: Pulmonary effort is normal. No respiratory distress.      Breath sounds: Normal breath sounds.   Abdominal:      General: There is no distension.      Palpations: Abdomen is soft.      Tenderness: There is no abdominal tenderness.   Musculoskeletal:         General: No tenderness or deformity.      Right lower leg: No edema.      Left lower leg: No edema.   Skin:     General: Skin is " warm and dry.      Findings: No lesion or rash.   Neurological:      General: No focal deficit present.      Mental Status: She is alert. Mental status is at baseline.      Motor: No weakness.      Coordination: Coordination normal.   Psychiatric:         Mood and Affect: Mood normal.         Behavior: Behavior normal.         Thought Content: Thought content normal.         Judgment: Judgment normal.       Assessment/Plan:   1. Elevated BP without diagnosis of hypertension  Comments:  - BP log given to patient. She will monitor her home BPs.  - Patient counseled regarding lifestyle modifications with diet and exercise.  - Re-evaluation in 6 weeks.       Follow up in about 6 weeks (around 4/25/2024) for Elevated BP w/o Dx of HTN.

## 2024-03-21 ENCOUNTER — DOCUMENTATION ONLY (OUTPATIENT)
Dept: FAMILY MEDICINE | Facility: CLINIC | Age: 54
End: 2024-03-21
Payer: COMMERCIAL

## 2024-03-22 ENCOUNTER — DOCUMENTATION ONLY (OUTPATIENT)
Dept: FAMILY MEDICINE | Facility: CLINIC | Age: 54
End: 2024-03-22
Payer: COMMERCIAL

## 2024-09-13 ENCOUNTER — TELEPHONE (OUTPATIENT)
Dept: FAMILY MEDICINE | Facility: CLINIC | Age: 54
End: 2024-09-13
Payer: COMMERCIAL

## 2024-09-13 DIAGNOSIS — Z13.1 DIABETES MELLITUS SCREENING: ICD-10-CM

## 2024-09-13 DIAGNOSIS — Z13.220 NEED FOR LIPID SCREENING: ICD-10-CM

## 2024-09-13 DIAGNOSIS — Z00.00 ANNUAL PHYSICAL EXAM: Primary | ICD-10-CM

## 2024-09-13 NOTE — TELEPHONE ENCOUNTER
----- Message from Otis Caldwell sent at 9/13/2024  8:14 AM CDT -----  Who Called: Cecelia Braun    Caller is requesting assistance/information from provider's office.    Symptoms (please be specific):    How long has patient had these symptoms:    List of preferred pharmacies on file (remove unneeded): [unfilled]  If different, enter pharmacy into here including location and phone number:       Patient's Preferred Phone Number on File: [phone]   Best Call Back Number, if different:  Additional Information: vance reyes make sure pt labs are ordered before wellness appt on 10/10

## 2024-09-13 NOTE — TELEPHONE ENCOUNTER
I have ordered the following labs. Please notify the patient.    Orders Placed This Encounter   Procedures    Lipid Panel     Standing Status:   Future     Standing Expiration Date:   12/13/2024    Hemoglobin A1C     Standing Status:   Future     Standing Expiration Date:   12/13/2024    Comprehensive Metabolic Panel     Standing Status:   Future     Standing Expiration Date:   12/13/2024

## 2024-10-08 LAB
CHOLEST SERPL-MSCNC: 158 MG/DL (ref 0–200)
HDLC SERPL-MCNC: 60 MG/DL (ref 35–70)
LDLC SERPL CALC-MCNC: 81 MG/DL (ref 0–160)
TRIGL SERPL-MCNC: 85 MG/DL (ref 40–160)

## 2024-10-10 ENCOUNTER — LAB VISIT (OUTPATIENT)
Dept: LAB | Facility: HOSPITAL | Age: 54
End: 2024-10-10
Attending: NURSE PRACTITIONER
Payer: COMMERCIAL

## 2024-10-10 ENCOUNTER — OFFICE VISIT (OUTPATIENT)
Dept: FAMILY MEDICINE | Facility: CLINIC | Age: 54
End: 2024-10-10
Payer: COMMERCIAL

## 2024-10-10 ENCOUNTER — DOCUMENTATION ONLY (OUTPATIENT)
Dept: FAMILY MEDICINE | Facility: CLINIC | Age: 54
End: 2024-10-10

## 2024-10-10 VITALS
BODY MASS INDEX: 34.7 KG/M2 | HEART RATE: 79 BPM | HEIGHT: 61 IN | TEMPERATURE: 97 F | WEIGHT: 183.81 LBS | RESPIRATION RATE: 18 BRPM | DIASTOLIC BLOOD PRESSURE: 82 MMHG | OXYGEN SATURATION: 98 % | SYSTOLIC BLOOD PRESSURE: 123 MMHG

## 2024-10-10 DIAGNOSIS — I10 PRIMARY HYPERTENSION: ICD-10-CM

## 2024-10-10 DIAGNOSIS — Z00.00 WELLNESS EXAMINATION: ICD-10-CM

## 2024-10-10 DIAGNOSIS — E66.09 CLASS 1 OBESITY DUE TO EXCESS CALORIES WITHOUT SERIOUS COMORBIDITY WITH BODY MASS INDEX (BMI) OF 34.0 TO 34.9 IN ADULT: ICD-10-CM

## 2024-10-10 DIAGNOSIS — Z00.00 WELLNESS EXAMINATION: Primary | ICD-10-CM

## 2024-10-10 DIAGNOSIS — E66.811 CLASS 1 OBESITY DUE TO EXCESS CALORIES WITHOUT SERIOUS COMORBIDITY WITH BODY MASS INDEX (BMI) OF 34.0 TO 34.9 IN ADULT: ICD-10-CM

## 2024-10-10 LAB
EST. AVERAGE GLUCOSE BLD GHB EST-MCNC: 111.2 MG/DL
HBA1C MFR BLD: 5.5 %
TSH SERPL-ACNC: 0.84 UIU/ML (ref 0.35–4.94)

## 2024-10-10 PROCEDURE — 36415 COLL VENOUS BLD VENIPUNCTURE: CPT

## 2024-10-10 PROCEDURE — 1159F MED LIST DOCD IN RCRD: CPT | Mod: CPTII,,, | Performed by: NURSE PRACTITIONER

## 2024-10-10 PROCEDURE — 3074F SYST BP LT 130 MM HG: CPT | Mod: CPTII,,, | Performed by: NURSE PRACTITIONER

## 2024-10-10 PROCEDURE — 84443 ASSAY THYROID STIM HORMONE: CPT

## 2024-10-10 PROCEDURE — 99396 PREV VISIT EST AGE 40-64: CPT | Mod: ,,, | Performed by: NURSE PRACTITIONER

## 2024-10-10 PROCEDURE — 3079F DIAST BP 80-89 MM HG: CPT | Mod: CPTII,,, | Performed by: NURSE PRACTITIONER

## 2024-10-10 PROCEDURE — 83036 HEMOGLOBIN GLYCOSYLATED A1C: CPT

## 2024-10-10 PROCEDURE — 1160F RVW MEDS BY RX/DR IN RCRD: CPT | Mod: CPTII,,, | Performed by: NURSE PRACTITIONER

## 2024-10-10 PROCEDURE — 3008F BODY MASS INDEX DOCD: CPT | Mod: CPTII,,, | Performed by: NURSE PRACTITIONER

## 2024-10-10 PROCEDURE — 4010F ACE/ARB THERAPY RXD/TAKEN: CPT | Mod: CPTII,,, | Performed by: NURSE PRACTITIONER

## 2024-10-10 RX ORDER — LOSARTAN POTASSIUM 25 MG/1
25 TABLET ORAL DAILY
COMMUNITY
Start: 2024-09-19

## 2024-10-10 NOTE — ASSESSMENT & PLAN NOTE
A1C/TSH ordered; additional labs previously done  Declines Tdap, Covid, and Influenza  MMG, PAP up to date  Colonoscopy to date

## 2024-10-10 NOTE — ASSESSMENT & PLAN NOTE
BP at goal  Continue current medication (Losartan)  Daily BP check; bring log all clinic visits  Instructed to report to ED for any BP greater than 200/100, dizziness, syncope, CP, or SOB  Keep appt. With PCP for follow up  Patient is agreeable to plan and verbalizes understanding

## 2024-10-10 NOTE — PROGRESS NOTES
Subjective:      Patient ID: Cecelia Braun is a 54 y.o. White female      Chief Complaint: Annual Exam (Wellness /)       Past Medical History:   Diagnosis Date    Kidney stones     No known health problems     Primary hypertension 10/10/2024    Renal cyst, right 10/05/2023        HPI  Presents to clinic for Annual Wellness exam.  Denies any acute problems.    Health Maintenance         Date Due Completion Date    TETANUS VACCINE 03/14/2025 (Originally 7/29/1989) ---    Shingles Vaccine (1 of 2) 03/14/2025 (Originally 7/29/2021) ---    Influenza Vaccine (1) 06/30/2025 (Originally 9/1/2024) ---    COVID-19 Vaccine (1 - 2024-25 season) 10/10/2025 (Originally 9/1/2024) ---    Mammogram 02/01/2025 2/1/2024    Cervical Cancer Screening 02/27/2026 2/27/2023    Hemoglobin A1c (Diabetic Prevention Screening) 10/11/2026 10/11/2023    Colorectal Cancer Screening 06/06/2027 6/6/2022    Lipid Panel 10/07/2029 10/7/2024    RSV Vaccine (Age 60+ and Pregnant patients) (1 - 1-dose 75+ series) 07/29/2046 ---        A1C/TSH ordered; additional labs previously done  Declines Tdap, Covid, and Influenza    Patient Care Team:  Chata Gilliam DO as PCP - General (Family Medicine)  Milo Kidd Jr., MD as Consulting Physician (Obstetrics and Gynecology)  Cam Ramirez MD as Consulting Physician (Urology)      Review of Systems   Constitutional: Negative.  Negative for appetite change, chills and fever.   HENT: Negative.     Eyes: Negative.  Negative for discharge and redness.   Respiratory: Negative.  Negative for shortness of breath.    Cardiovascular: Negative.  Negative for chest pain.   Gastrointestinal: Negative.    Endocrine: Negative.    Genitourinary: Negative.    Integumentary:  Negative.   Allergic/Immunologic: Negative.    Neurological: Negative.    Psychiatric/Behavioral: Negative.     All other systems reviewed and are negative.          Objective:      Vitals:    10/10/24 1402   BP: 123/82   Pulse: 79   Resp: 18    Temp: 97 °F (36.1 °C)      Body mass index is 34.73 kg/m².     Physical Exam  Vitals reviewed.   Constitutional:       Appearance: Normal appearance.   HENT:      Head: Normocephalic.      Mouth/Throat:      Mouth: Mucous membranes are moist.   Eyes:      Conjunctiva/sclera: Conjunctivae normal.      Pupils: Pupils are equal, round, and reactive to light.   Cardiovascular:      Rate and Rhythm: Normal rate and regular rhythm.   Pulmonary:      Effort: Pulmonary effort is normal. No respiratory distress.      Breath sounds: Normal breath sounds.   Abdominal:      General: Bowel sounds are normal. There is no distension.      Palpations: Abdomen is soft.   Musculoskeletal:         General: Normal range of motion.      Cervical back: Normal range of motion and neck supple.   Skin:     General: Skin is warm and dry.   Neurological:      Mental Status: She is alert and oriented to person, place, and time.   Psychiatric:         Mood and Affect: Mood normal.            Current Outpatient Medications:     losartan (COZAAR) 25 MG tablet, Take 25 mg by mouth once daily., Disp: , Rfl:     Assessment & Plan:     Problem List Items Addressed This Visit          Cardiac/Vascular    Primary hypertension     BP at goal  Continue current medication (Losartan)  Daily BP check; bring log all clinic visits  Instructed to report to ED for any BP greater than 200/100, dizziness, syncope, CP, or SOB  Keep appt. With PCP for follow up  Patient is agreeable to plan and verbalizes understanding           Relevant Orders    TSH    Hemoglobin A1C       Endocrine    Class 1 obesity due to excess calories without serious comorbidity with body mass index (BMI) of 34.0 to 34.9 in adult     Educated on healthy eating habits  Aerobic exercise 3-4 per week x 40 minutes per day            Other    Wellness examination - Primary     A1C/TSH ordered; additional labs previously done  Declines Tdap, Covid, and Influenza  MMG, PAP up to  date  Colonoscopy to date         Relevant Orders    TSH    Hemoglobin A1C

## 2024-10-11 DIAGNOSIS — R74.01 ELEVATED ALT MEASUREMENT: Primary | ICD-10-CM

## 2024-10-14 ENCOUNTER — TELEPHONE (OUTPATIENT)
Dept: FAMILY MEDICINE | Facility: CLINIC | Age: 54
End: 2024-10-14
Payer: COMMERCIAL

## 2024-10-14 NOTE — TELEPHONE ENCOUNTER
----- Message from Chata Gilliam DO sent at 10/11/2024  7:56 AM CDT -----  Lipid panel negative.    CMP positive for slightly elevated ALT 37 (this is a liver enzyme). Recommend patient avoid Tylenol, alcohol, green tea, and any herbal remedies. Will order CMP for patient to repeat in 1 month for re-evaluation.

## 2025-02-20 ENCOUNTER — PATIENT MESSAGE (OUTPATIENT)
Dept: ADMINISTRATIVE | Facility: HOSPITAL | Age: 55
End: 2025-02-20
Payer: COMMERCIAL

## 2025-02-21 ENCOUNTER — PATIENT OUTREACH (OUTPATIENT)
Facility: CLINIC | Age: 55
End: 2025-02-21
Payer: COMMERCIAL

## 2025-02-21 NOTE — PROGRESS NOTES
Health Maintenance Topic(s) Outreach Outcomes & Actions Taken:    Breast Cancer Screening - Outreach Outcomes & Actions Taken  : External Records Requested & Care Team Updated if Applicable and VAN sent to [facility]       Additional Notes:

## 2025-02-21 NOTE — LETTER
AUTHORIZATION FOR RELEASE OF CONFIDENTIAL INFORMATION      2025      Dear AMEYA,    We are seeing Cecelia Braun, date of birth 1970, in the clinic at [facility].  Chata Gilliam DO is the patient's PCP. Cecelia Braun has an outstanding lab/procedure at the time we reviewed his chart.  In order to help keep her health information updated, Cecelia has authorized us to request the following medical record(s):        Mammogram           Please fax any records to Chata Gilliam DO's at  [phone]      If you have any questions you can contact Nadira Ignacio at [phone].             Patient Name: Cecelia Braun  :1970  Patient Phone #:[phone]

## 2025-02-24 ENCOUNTER — PATIENT MESSAGE (OUTPATIENT)
Dept: ADMINISTRATIVE | Facility: HOSPITAL | Age: 55
End: 2025-02-24
Payer: COMMERCIAL

## 2025-04-29 ENCOUNTER — OFFICE VISIT (OUTPATIENT)
Dept: URGENT CARE | Facility: CLINIC | Age: 55
End: 2025-04-29
Payer: COMMERCIAL

## 2025-04-29 VITALS
HEIGHT: 61 IN | WEIGHT: 183 LBS | HEART RATE: 79 BPM | OXYGEN SATURATION: 100 % | BODY MASS INDEX: 34.55 KG/M2 | TEMPERATURE: 97 F | DIASTOLIC BLOOD PRESSURE: 79 MMHG | SYSTOLIC BLOOD PRESSURE: 136 MMHG | RESPIRATION RATE: 18 BRPM

## 2025-04-29 DIAGNOSIS — N32.89 BLADDER SPASMS: ICD-10-CM

## 2025-04-29 DIAGNOSIS — R30.0 DYSURIA: Primary | ICD-10-CM

## 2025-04-29 LAB
BILIRUB UR QL STRIP: NEGATIVE
GLUCOSE UR QL STRIP: NEGATIVE
KETONES UR QL STRIP: NEGATIVE
LEUKOCYTE ESTERASE UR QL STRIP: NEGATIVE
PH, POC UA: 7
POC BLOOD, URINE: POSITIVE
POC NITRATES, URINE: NEGATIVE
PROT UR QL STRIP: NEGATIVE
SP GR UR STRIP: 1.01 (ref 1–1.03)
UROBILINOGEN UR STRIP-ACNC: NORMAL (ref 0.1–1.1)

## 2025-04-29 PROCEDURE — 81003 URINALYSIS AUTO W/O SCOPE: CPT | Mod: QW,,, | Performed by: FAMILY MEDICINE

## 2025-04-29 PROCEDURE — 99213 OFFICE O/P EST LOW 20 MIN: CPT | Mod: ,,, | Performed by: FAMILY MEDICINE

## 2025-04-29 RX ORDER — ROSUVASTATIN CALCIUM 20 MG/1
20 TABLET, COATED ORAL DAILY
COMMUNITY

## 2025-04-29 NOTE — PROGRESS NOTES
"Patient ID: Cecelia Braun is a 54 y.o. female.  Chief Complaint: Urinary Tract Infection (Entered by patient) and Dysuria    HPI:   Patient presents here today for above reason.      Patient is a 54 y.o. female who presents to urgent care with complaints of constant urgency to urinate x1 days. Alleviating factors include ketorolac 10mg (prescribed by urologist) with mild amount of relief. Patient denies burning,   Does see a urologist here locally.  Sees .   She goes on to report that she does feel significantly better today.  However she is wishing and requesting to rule out infection.  Does have history of kidney stones.    Past Medical History:  Past Medical History:   Diagnosis Date    Kidney stones     No known health problems     Primary hypertension 10/10/2024    Renal cyst, right 10/05/2023     Past Surgical History:   Procedure Laterality Date     SECTION      COLONOSCOPY      HEMORRHOID SURGERY      KIDNEY STONE SURGERY      TONSILLECTOMY       Review of patient's allergies indicates:  No Known Allergies  Current Outpatient Medications   Medication Instructions    losartan (COZAAR) 50 mg, Daily    rosuvastatin (CRESTOR) 20 mg, Daily     Social History[1]    ROS:   Review of Systems  12 point review of systems conducted, negative except as stated in the history of present illness. See HPI for details.   Vitals/PE:   Visit Vitals  /79   Pulse 79   Temp 97.3 °F (36.3 °C)   Resp 18   Ht 5' 1" (1.549 m)   Wt 83 kg (183 lb)   SpO2 100%   BMI 34.58 kg/m²     Physical Exam  General: Alert and oriented, No acute distress.   Eye: Normal conjunctiva without exudate.  HENMT: Normocephalic/AT, Normal hearing, Oral mucosa is moist and pink   Neck: No goiter visualized.   Respiratory: Lungs CTAB, Respirations are non-labored, Breath sounds are equal, Symmetrical chest wall expansion.  Cardiovascular: Normal rate, Regular rhythm, No murmur, No edema.   Gastrointestinal: Non-distended. "   Genitourinary: Deferred.  Musculoskeletal: Normal ROM, Normal gait, No deformities or amputations.  Integumentary: Warm, Dry, Intact. No diaphoresis, or flushing.  Neurologic: No focal deficits, Cranial Nerves II-XII are grossly intact.   Psychiatric: Cooperative, Appropriate mood & affect, Normal judgment, Non-suicidal.    Results for orders placed or performed in visit on 04/29/25   POCT Urinalysis, Dipstick, Automated, W/O Scope    Collection Time: 04/29/25  9:40 AM   Result Value Ref Range    POC Blood, Urine Positive (A) Negative    POC Bilirubin, Urine Negative Negative    POC Urobilinogen, Urine NORMAL 0.1 - 1.1    POC Ketones, Urine Negative Negative    POC Protein, Urine Negative Negative    POC Nitrates, Urine Negative Negative    POC Glucose, Urine Negative Negative    pH, UA 7     POC Specific Gravity, Urine 1.010 1.003 - 1.029    POC Leukocytes, Urine Negative Negative     Assessment/Plan:   Dysuria  -     POCT Urinalysis, Dipstick, Automated, W/O Scope  No evidence of acute bacterial infection today.  Watch and wait for now.  Offered reassurance.  As scheduled appointment with urologist tomorrow.  Keep appointment.  Return to clinic as needed.  Bladder spasms  As above     Orders Placed This Encounter   Procedures    POCT Urinalysis, Dipstick, Automated, W/O Scope       Education and counseling done face to face regarding medical conditions and plan. Contact office if new symptoms develop. Should any symptoms ever significantly worsen seek emergency medical attention/go to ER. Follow up at least yearly for wellness or sooner PRN. Nurse to call patient with any results. The patient is receptive, expresses understanding and is agreeable to plan. All questions have been answered.  No follow-ups on file.           [1]   Social History  Socioeconomic History    Marital status:    Occupational History    Occupation: Office Work   Tobacco Use    Smoking status: Never    Smokeless tobacco: Never    Substance and Sexual Activity    Alcohol use: Not Currently    Drug use: Never    Sexual activity: Not Currently     Birth control/protection: I.U.D.     Social Drivers of Health     Financial Resource Strain: Low Risk  (10/3/2024)    Overall Financial Resource Strain (CARDIA)     Difficulty of Paying Living Expenses: Not hard at all   Food Insecurity: No Food Insecurity (10/3/2024)    Hunger Vital Sign     Worried About Running Out of Food in the Last Year: Never true     Ran Out of Food in the Last Year: Never true   Physical Activity: Insufficiently Active (10/3/2024)    Exercise Vital Sign     Days of Exercise per Week: 3 days     Minutes of Exercise per Session: 40 min   Stress: Stress Concern Present (10/3/2024)    [employer] [employer] - Occupational Stress Questionnaire     Feeling of Stress : To some extent   Housing Stability: Unknown (10/3/2024)    Housing Stability Vital Sign     Unable to Pay for Housing in the Last Year: No

## 2025-04-29 NOTE — PATIENT INSTRUCTIONS
Assessment/Plan:   Dysuria  -     POCT Urinalysis, Dipstick, Automated, W/O Scope  No evidence of acute bacterial infection today.  Watch and wait for now.  Offered reassurance.  As scheduled appointment with urologist tomorrow.  Keep appointment.  Return to clinic as needed.  Bladder spasms  As above     Orders Placed This Encounter   Procedures    POCT Urinalysis, Dipstick, Automated, W/O Scope       Education and counseling done face to face regarding medical conditions and plan. Contact office if new symptoms develop. Should any symptoms ever significantly worsen seek emergency medical attention/go to ER.

## 2025-08-04 ENCOUNTER — TELEPHONE (OUTPATIENT)
Dept: FAMILY MEDICINE | Facility: CLINIC | Age: 55
End: 2025-08-04
Payer: COMMERCIAL